# Patient Record
Sex: MALE | URBAN - METROPOLITAN AREA
[De-identification: names, ages, dates, MRNs, and addresses within clinical notes are randomized per-mention and may not be internally consistent; named-entity substitution may affect disease eponyms.]

---

## 2022-09-08 ENCOUNTER — NURSE TRIAGE (OUTPATIENT)
Dept: ADMINISTRATIVE | Facility: CLINIC | Age: 27
End: 2022-09-08

## 2022-09-08 NOTE — TELEPHONE ENCOUNTER
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 Stated the doctor gave him medication and didn't tell him anything.     Pt advised HSV 1 is herpes simplex 1. Pt advised to call his doctor to discuss his results and ask questions about his diagnosis. Pt stated the doctor does not care about him. Advised the MD has to answer his questions regarding his labs and diagnosis. Pt also offered a [facility] virtual visit and refused.